# Patient Record
Sex: MALE | Race: WHITE | Employment: OTHER | URBAN - METROPOLITAN AREA
[De-identification: names, ages, dates, MRNs, and addresses within clinical notes are randomized per-mention and may not be internally consistent; named-entity substitution may affect disease eponyms.]

---

## 2020-10-11 ENCOUNTER — HOSPITAL ENCOUNTER (EMERGENCY)
Age: 85
Discharge: HOME | End: 2020-10-11
Attending: EMERGENCY MEDICINE

## 2020-10-11 VITALS
HEIGHT: 70 IN | TEMPERATURE: 97.9 F | DIASTOLIC BLOOD PRESSURE: 54 MMHG | OXYGEN SATURATION: 94 % | WEIGHT: 190 LBS | SYSTOLIC BLOOD PRESSURE: 119 MMHG | BODY MASS INDEX: 27.2 KG/M2 | HEART RATE: 75 BPM | RESPIRATION RATE: 20 BRPM

## 2020-10-11 LAB
BACTERIA: ABNORMAL /HPF
BILIRUBIN URINE: NEGATIVE
BLOOD, URINE: ABNORMAL
CLARITY: CLEAR
COLOR: YELLOW
EPITHELIAL CELLS, UA: ABNORMAL /HPF (ref 0–5)
GLUCOSE URINE: NEGATIVE MG/DL
KETONES, URINE: NEGATIVE MG/DL
LEUKOCYTE ESTERASE, URINE: ABNORMAL
MICROSCOPIC EXAMINATION: YES
MUCUS: ABNORMAL /LPF
NITRITE, URINE: NEGATIVE
PH UA: >=9 (ref 5–8)
PROTEIN UA: 100 MG/DL
RBC UA: ABNORMAL /HPF (ref 0–4)
SPECIFIC GRAVITY UA: 1.01 (ref 1–1.03)
URINE REFLEX TO CULTURE: ABNORMAL
URINE TYPE: ABNORMAL
UROBILINOGEN, URINE: 0.2 E.U./DL
WBC UA: ABNORMAL /HPF (ref 0–5)

## 2020-10-11 PROCEDURE — 6370000000 HC RX 637 (ALT 250 FOR IP): Performed by: EMERGENCY MEDICINE

## 2020-10-11 PROCEDURE — 81001 URINALYSIS AUTO W/SCOPE: CPT

## 2020-10-11 PROCEDURE — 99283 EMERGENCY DEPT VISIT LOW MDM: CPT

## 2020-10-11 RX ORDER — CEFUROXIME AXETIL 250 MG/1
250 TABLET ORAL 2 TIMES DAILY
Qty: 14 TABLET | Refills: 0 | Status: SHIPPED | OUTPATIENT
Start: 2020-10-11 | End: 2020-10-18

## 2020-10-11 RX ORDER — LIDOCAINE HYDROCHLORIDE 20 MG/ML
JELLY TOPICAL PRN
Status: DISCONTINUED | OUTPATIENT
Start: 2020-10-11 | End: 2020-10-11 | Stop reason: HOSPADM

## 2020-10-11 RX ORDER — LISINOPRIL 10 MG/1
10 TABLET ORAL DAILY
COMMUNITY

## 2020-10-11 RX ORDER — ASPIRIN 81 MG/1
81 TABLET, CHEWABLE ORAL DAILY
COMMUNITY

## 2020-10-11 RX ORDER — CLOPIDOGREL BISULFATE 75 MG/1
75 TABLET ORAL DAILY
COMMUNITY

## 2020-10-11 RX ORDER — CEFUROXIME AXETIL 250 MG/1
250 TABLET ORAL EVERY 12 HOURS SCHEDULED
Status: DISCONTINUED | OUTPATIENT
Start: 2020-10-11 | End: 2020-10-11 | Stop reason: HOSPADM

## 2020-10-11 RX ADMIN — LIDOCAINE HYDROCHLORIDE: 20 JELLY TOPICAL at 05:20

## 2020-10-11 RX ADMIN — CEFUROXIME AXETIL 250 MG: 250 TABLET ORAL at 07:20

## 2020-10-11 ASSESSMENT — PAIN DESCRIPTION - FREQUENCY: FREQUENCY: INTERMITTENT

## 2020-10-11 ASSESSMENT — PAIN SCALES - GENERAL: PAINLEVEL_OUTOF10: 10

## 2020-10-11 ASSESSMENT — PAIN DESCRIPTION - DESCRIPTORS: DESCRIPTORS: SPASM

## 2020-10-11 ASSESSMENT — PAIN DESCRIPTION - LOCATION: LOCATION: ABDOMEN

## 2020-10-11 ASSESSMENT — PAIN DESCRIPTION - PAIN TYPE: TYPE: ACUTE PAIN

## 2020-10-11 NOTE — ED PROVIDER NOTES
[address] [facility] [address] ENCOUNTER          ATTENDING PHYSICIAN NOTE       Date of evaluation: 10/11/2020    Chief Complaint     Urinary Catheter Problem      History of Present Illness     Elvin Moraes is a 80 y.o. male with a history of prostate problems who presents with complaints of bladder spasm from an indwelling Diaz catheter. He has been experiencing discomfort with the catheter for the last several hours. It is now the point where he is writhing in pain. Apparently he has had an indwelling Diaz catheter for several months and he gets it changed monthly. He gets his care through the [facility].  They have replaced the catheter and apparently caused trauma to his urethra on several occasions. He is not had any bleeding recently but has noted some foul-smelling cloudy urine draining in his Diaz bag. Review of Systems     The patient denies fever or chills, nausea or vomiting. He denies any abdominal pain. See HPI for further details. Review of systems otherwise negative. Past Medical, Surgical, Family, and Social History     He has no past medical history on file. He has no past surgical history on file. His family history is not on file. He     Medications     Previous Medications    ASPIRIN 81 MG CHEWABLE TABLET    Take 81 mg by mouth daily    CLOPIDOGREL (PLAVIX) 75 MG TABLET    Take 75 mg by mouth daily    LISINOPRIL (PRINIVIL;ZESTRIL) 10 MG TABLET    Take 10 mg by mouth daily       Allergies     He is allergic to larsen-2 inhibitors.     Physical Exam     INITIAL VITALS: BP: (!) 169/74, Temp: 97.9 °F (36.6 °C), Pulse: 75, Resp: 20, SpO2: 100 %   Constitutional:  Well developed, well nourished, no acute distress, non-toxic appearance   Respiratory:  No respiratory distress, normal effort   Cardiovascular:  Normal rate, normal rhythm  GI:  Abdomen soft, nontender, and non-distended with normal bowel sounds   :  No flank/CVA tenderness   Musculoskeletal:  No edema Integument:  Well hydrated     Diagnostic Results     LABS:   Results for orders placed or performed during the hospital encounter of 10/11/20   Urine Reflex to Culture    Specimen: Urine, clean catch   Result Value Ref Range    Color, UA Yellow Straw/Yellow    Clarity, UA Clear Clear    Glucose, Ur Negative Negative mg/dL    Bilirubin Urine Negative Negative    Ketones, Urine Negative Negative mg/dL    Specific Gravity, UA 1.010 1.005 - 1.030    Blood, Urine LARGE (A) Negative    pH, UA >=9.0 (A) 5.0 - 8.0    Protein,  (A) Negative mg/dL    Urobilinogen, Urine 0.2 <2.0 E.U./dL    Nitrite, Urine Negative Negative    Leukocyte Esterase, Urine LARGE (A) Negative    Microscopic Examination YES     Urine Type Other        RECENT VITALS:  BP: 133/64, Temp: 97.9 °F (36.6 °C), Pulse: 75, Resp: 20, SpO2: 100 %     Procedures      A Diaz catheter was placed in this patient with return of 300cc of urine which decompressed the patient's bladder and improved his symptoms. ED Course     Nursing Notes, Past Medical Hx, Past Surgical Hx, Social Hx, Allergies, and Family Hx were reviewed. The patient was given the following medications:  Orders Placed This Encounter   Medications    lidocaine (XYLOCAINE) 2 % uro-jet    cefUROXime (CEFTIN) 250 MG tablet     Sig: Take 1 tablet by mouth 2 times daily for 7 days     Dispense:  14 tablet     Refill:  0       CONSULTS:  None    MEDICAL DECISION MAKING / ASSESSMENT / PLAN     Elvin Bell is a 80 y.o. male presenting with pain associated with an indwelling Diaz catheter. The catheter appeared very old and poorly maintained and was withdrawn and a new catheter inserted with use of lubricating lidocaine jelly. 300 cc of urine was returned and it was cloudy and then slightly pink. He felt much improved with the insertion of the new catheter and there was no gross hematuria. Urinalysis is revealing dense of infection and he will need to be started on antibiotics.   I do not have any old culture results to guide therapy initially therefore a urine culture is obtained as well and will be pending. Changes in antibiotics will be communicated to the patient once those results are returned. He is asked to make a follow-up appointment with urology at the [place]. Clinical Impression     1. Spasm of bladder    2.  Urinary tract infection associated with indwelling urethral catheter, initial encounter ([facility].)        Disposition     PATIENT REFERRED TO:  Your urologist at the [place]      this week      DISCHARGE MEDICATIONS:  New Prescriptions    CEFUROXIME (CEFTIN) 250 MG TABLET    Take 1 tablet by mouth 2 times daily for 7 days       DISPOSITION DISPOSITION Decision To Discharge 10/11/2020 05:58:53 AM          Deepali Beasley MD  10/11/20 [id number]

## 2020-10-11 NOTE — ED NOTES
Upon getting ready to discharge went into room and mercedes catheter draining dark red urine, Dr. Darrold Angelucci made aware and instructed to irrigate mercedes to clear, so irrigated with 500 ml sterile water per sterile protocol, urine clear to pale pink color. Patient tolerated fairly well. Leg bag and extra night time bag given.       Marisel Capone RN  10/11/20 [id number]

## 2020-10-11 NOTE — ED NOTES
Pt discharged to home per Lyla. Pt given discharge instructions and verbalized understanding. Pt ambulatory at discharge and left without incident.       Juan Carrillo RN  10/11/20 [id number]

## 2020-10-11 NOTE — ED NOTES
Upon arrival patient with duct tape used to hold catheter pre-existing to right thigh, with c/o urine not draining and spasms of bladder intermittently. Removed mercedes catheter per Radha Avalos RN. Tip of mercedes coude catheter with bloody drainage/ mucoid like drainage.      Leena Santos RN  10/11/20 [id number]